# Patient Record
Sex: FEMALE | Race: WHITE | Employment: FULL TIME | URBAN - METROPOLITAN AREA
[De-identification: names, ages, dates, MRNs, and addresses within clinical notes are randomized per-mention and may not be internally consistent; named-entity substitution may affect disease eponyms.]

---

## 2020-07-14 ENCOUNTER — HOSPITAL ENCOUNTER (EMERGENCY)
Age: 45
Discharge: HOME | End: 2020-07-14
Attending: EMERGENCY MEDICINE
Payer: COMMERCIAL

## 2020-07-14 VITALS
RESPIRATION RATE: 21 BRPM | HEIGHT: 66 IN | HEART RATE: 72 BPM | OXYGEN SATURATION: 98 % | SYSTOLIC BLOOD PRESSURE: 134 MMHG | DIASTOLIC BLOOD PRESSURE: 88 MMHG | WEIGHT: 125.8 LBS | BODY MASS INDEX: 20.22 KG/M2 | TEMPERATURE: 98.1 F

## 2020-07-14 LAB
ANION GAP SERPL CALCULATED.3IONS-SCNC: 15 MMOL/L (ref 3–16)
BASOPHILS ABSOLUTE: 0.1 K/UL (ref 0–0.2)
BASOPHILS RELATIVE PERCENT: 0.7 %
BUN BLDV-MCNC: 15 MG/DL (ref 7–20)
CALCIUM SERPL-MCNC: 9.7 MG/DL (ref 8.3–10.6)
CHLORIDE BLD-SCNC: 100 MMOL/L (ref 99–110)
CO2: 23 MMOL/L (ref 21–32)
CREAT SERPL-MCNC: 0.8 MG/DL (ref 0.6–1.1)
EOSINOPHILS ABSOLUTE: 0.1 K/UL (ref 0–0.6)
EOSINOPHILS RELATIVE PERCENT: 1.6 %
GFR AFRICAN AMERICAN: >60
GFR NON-AFRICAN AMERICAN: >60
GLUCOSE BLD-MCNC: 143 MG/DL (ref 70–99)
HCT VFR BLD CALC: 39.6 % (ref 36–48)
HEMOGLOBIN: 12.9 G/DL (ref 12–16)
LYMPHOCYTES ABSOLUTE: 2.1 K/UL (ref 1–5.1)
LYMPHOCYTES RELATIVE PERCENT: 22.9 %
MCH RBC QN AUTO: 28.3 PG (ref 26–34)
MCHC RBC AUTO-ENTMCNC: 32.6 G/DL (ref 31–36)
MCV RBC AUTO: 86.9 FL (ref 80–100)
MONOCYTES ABSOLUTE: 0.8 K/UL (ref 0–1.3)
MONOCYTES RELATIVE PERCENT: 8.1 %
NEUTROPHILS ABSOLUTE: 6.2 K/UL (ref 1.7–7.7)
NEUTROPHILS RELATIVE PERCENT: 66.7 %
PDW BLD-RTO: 13 % (ref 12.4–15.4)
PLATELET # BLD: 312 K/UL (ref 135–450)
PMV BLD AUTO: 7.5 FL (ref 5–10.5)
POTASSIUM REFLEX MAGNESIUM: 3.6 MMOL/L (ref 3.5–5.1)
RBC # BLD: 4.55 M/UL (ref 4–5.2)
SODIUM BLD-SCNC: 138 MMOL/L (ref 136–145)
WBC # BLD: 9.3 K/UL (ref 4–11)

## 2020-07-14 PROCEDURE — 96376 TX/PRO/DX INJ SAME DRUG ADON: CPT

## 2020-07-14 PROCEDURE — 96374 THER/PROPH/DIAG INJ IV PUSH: CPT

## 2020-07-14 PROCEDURE — 93005 ELECTROCARDIOGRAM TRACING: CPT | Performed by: EMERGENCY MEDICINE

## 2020-07-14 PROCEDURE — 2500000003 HC RX 250 WO HCPCS: Performed by: EMERGENCY MEDICINE

## 2020-07-14 PROCEDURE — 99285 EMERGENCY DEPT VISIT HI MDM: CPT

## 2020-07-14 PROCEDURE — 36415 COLL VENOUS BLD VENIPUNCTURE: CPT

## 2020-07-14 PROCEDURE — 85025 COMPLETE CBC W/AUTO DIFF WBC: CPT

## 2020-07-14 PROCEDURE — 80048 BASIC METABOLIC PNL TOTAL CA: CPT

## 2020-07-14 RX ORDER — METOPROLOL TARTRATE 5 MG/5ML
2.5 INJECTION INTRAVENOUS ONCE
Status: COMPLETED | OUTPATIENT
Start: 2020-07-14 | End: 2020-07-14

## 2020-07-14 RX ORDER — METOPROLOL SUCCINATE 25 MG/1
25 TABLET, EXTENDED RELEASE ORAL DAILY
Qty: 30 TABLET | Refills: 1 | Status: SHIPPED | OUTPATIENT
Start: 2020-07-14 | End: 2020-09-12

## 2020-07-14 RX ADMIN — METOPROLOL TARTRATE 2.5 MG: 5 INJECTION, SOLUTION INTRAVENOUS at 17:23

## 2020-07-14 RX ADMIN — METOPROLOL TARTRATE 2.5 MG: 5 INJECTION, SOLUTION INTRAVENOUS at 16:27

## 2020-07-14 ASSESSMENT — ENCOUNTER SYMPTOMS
EYE PAIN: 0
RHINORRHEA: 0
DIARRHEA: 0
NAUSEA: 0
SORE THROAT: 0
VOMITING: 0
COUGH: 0
EYE DISCHARGE: 0
WHEEZING: 0
ABDOMINAL PAIN: 0
BACK PAIN: 0
SHORTNESS OF BREATH: 0

## 2020-07-14 NOTE — ED NOTES
Patient was getting ready to get up to bedside commode to urinate when she had a 28 second episode of SVT. [place] near syncope with this episode.  aware. Will give the Lopressor prior to getting up to urinate.      Selin Tabares RN  07/14/20 [id number]

## 2020-07-14 NOTE — ED PROVIDER NOTES
[facility]  eMERGENCY dEPARTMENT eNCOUnter        Pt Name: Gomez Sweet  MRN: [id number]  [place] 1975  Date of evaluation: 7/14/2020  Provider: Victor M Chaney MD  PCP: Hamzah Crane MD      [address] [place] [address]       Chief Complaint   Patient presents with    Palpitations     Had a cardiac ablation at [facility] 7/10/20. Feeling near syncopal with her palpitations. HISTORY OFPRESENT ILLNESS   (Location/Symptom, Timing/Onset, Context/Setting, Quality, Duration, Modifying Factors,Severity)  Note limiting factors. Gomez Sweet is a 38 y.o. female       Location/Symptom: Palpitations  Timing/Onset: Today  Context/Setting: Patient has a history of supraventricular tachycardia. She had a cardiac ablation at [facility] on July 10. Today she started having palpitations and feeling of passing out. Quality: No pain or pressure with this of shortness of breath. Duration: She has had repeated episodes of this today  Modifying Factors: She was on verapamil which was discontinued. Severity: No pain or pressure    Nursing Noteswere all reviewed and agreed with or any disagreements were addressed  in the HPI. REVIEW OF SYSTEMS    (2-9 systems for level 4, 10 or more for level 5)     Review of Systems   Constitutional: Negative for chills, fatigue and fever. HENT: Negative for ear pain, rhinorrhea and sore throat. Eyes: Negative for pain, discharge and visual disturbance. Respiratory: Negative for cough, shortness of breath and wheezing. Cardiovascular: Positive for palpitations. Negative for chest pain and leg swelling. Gastrointestinal: Negative for abdominal pain, diarrhea, nausea and vomiting. Genitourinary: Negative for difficulty urinating, dysuria, pelvic pain and vaginal discharge. Musculoskeletal: Negative for arthralgias, back pain, joint swelling and neck pain. Skin: Negative for rash.    Allergic/Immunologic: Negative for environmental allergies. Neurological: Positive for light-headedness. Negative for dizziness, seizures, syncope and headaches. Hematological: Negative for adenopathy. Psychiatric/Behavioral: Negative for dysphoric mood and suicidal ideas. The patient is nervous/anxious (Secondary to the palpitations).           PAST MEDICAL HISTORY     Past Medical History:   Diagnosis Date    Anxiety     Hypoglycemia     Pulmonary emboli (HCC)     post partum    SVT (supraventricular tachycardia) (HCC)          SURGICAL HISTORY     Past Surgical History:   Procedure Laterality Date    APPENDECTOMY       SECTION      FOOT SURGERY      excision glass - left    HYSTEROSCOPY  13    chromotubation    KNEE SURGERY      left reconstructive     VENTRICULAR ABLATION SURGERY           CURRENTMEDICATIONS       Previous Medications    PRENATAL MV-MIN-FE FUM-FA-DHA (PRENATAL 1 PO)    Take by mouth daily       ALLERGIES     Phenergan [promethazine hcl]    FAMILY HISTORY       Family History   Problem Relation Age of Onset    Heart Disease Father           SOCIAL HISTORY       Social History     Socioeconomic History    Marital status:      Spouse name: None    Number of children: None    Years of education: None    Highest education level: None   Occupational History    None   Social Needs    Financial resource strain: None    Food insecurity     Worry: None     Inability: None    Transportation needs     Medical: None     Non-medical: None   Tobacco Use    Smoking status: Never Smoker    Smokeless tobacco: Never Used   Substance and Sexual Activity    Alcohol use: No    Drug use: No    Sexual activity: Yes     Partners: Male   Lifestyle    Physical activity     Days per week: None     Minutes per session: None    Stress: None   Relationships    Social connections     Talks on phone: None     Gets together: None     Attends Orthodox service: None     Active member of club or organization: None     Attends meetings of clubs or organizations: None     Relationship status: None    Intimate partner violence     Fear of current or ex partner: None     Emotionally abused: None     Physically abused: None     Forced sexual activity: None   Other Topics Concern    None   Social History Narrative    None       SCREENINGS             PHYSICAL EXAM    (up to 7 for level 4, 8 or more for level 5)     ED Triage Vitals [07/14/20 1610]   BP Temp Temp Source Pulse Resp SpO2 Height Weight   130/87 98.1 °F (36.7 °C) Oral 92 18 100 % 5' 6\" (1.676 m) 125 lb 12.8 oz (57.1 kg)      height is 5' 6\" (1.676 m) and weight is 125 lb 12.8 oz (57.1 kg). Her oral temperature is 98.1 °F (36.7 °C). Her blood pressure is 134/88 and her pulse is 72. Her respiration is 21 and oxygen saturation is 98%. Physical Exam  Constitutional:       Appearance: She is well-developed. She is not diaphoretic. HENT:      Head: Normocephalic and atraumatic. Right Ear: External ear normal.      Left Ear: External ear normal.   Eyes:      General: No scleral icterus. Right eye: No discharge. Left eye: No discharge. Neck:      Musculoskeletal: Normal range of motion. Thyroid: No thyromegaly. Vascular: No JVD. Trachea: No tracheal deviation. Cardiovascular:      Rate and Rhythm: Normal rate and regular rhythm. Frequent extrasystoles are present. Heart sounds: No murmur. No friction rub. No gallop. Pulmonary:      Effort: Pulmonary effort is normal. No respiratory distress. Breath sounds: Normal breath sounds. No stridor. No wheezing or rales. Abdominal:      General: There is no distension. Palpations: Abdomen is soft. Tenderness: There is no abdominal tenderness. There is no guarding or rebound. Musculoskeletal:         General: No tenderness. Skin:     General: Skin is warm and dry. Findings: No rash (On exposed body surfaces).    Neurological:      Mental Status: She is alert and oriented to person, place, and time. Coordination: Coordination normal.   Psychiatric:         Behavior: Behavior normal.         Thought Content: Thought content normal.         DIAGNOSTIC RESULTS   LABS:    Results for orders placed or performed during the hospital encounter of 07/14/20   CBC Auto Differential   Result Value Ref Range    WBC 9.3 4.0 - 11.0 K/uL    RBC 4.55 4.00 - 5.20 M/uL    Hemoglobin 12.9 12.0 - 16.0 g/dL    Hematocrit 39.6 36.0 - 48.0 %    MCV 86.9 80.0 - 100.0 fL    MCH 28.3 26.0 - 34.0 pg    MCHC 32.6 31.0 - 36.0 g/dL    RDW 13.0 12.4 - 15.4 %    Platelets 551 681 - 966 K/uL    MPV 7.5 5.0 - 10.5 fL    Neutrophils % 66.7 %    Lymphocytes % 22.9 %    Monocytes % 8.1 %    Eosinophils % 1.6 %    Basophils % 0.7 %    Neutrophils Absolute 6.2 1.7 - 7.7 K/uL    Lymphocytes Absolute 2.1 1.0 - 5.1 K/uL    Monocytes Absolute 0.8 0.0 - 1.3 K/uL    Eosinophils Absolute 0.1 0.0 - 0.6 K/uL    Basophils Absolute 0.1 0.0 - 0.2 K/uL   Basic Metabolic Panel w/ Reflex to MG   Result Value Ref Range    Sodium 138 136 - 145 mmol/L    Potassium reflex Magnesium 3.6 3.5 - 5.1 mmol/L    Chloride 100 99 - 110 mmol/L    CO2 23 21 - 32 mmol/L    Anion Gap 15 3 - 16    Glucose 143 (H) 70 - 99 mg/dL    BUN 15 7 - 20 mg/dL    CREATININE 0.8 0.6 - 1.1 mg/dL    GFR Non-African American >60 >60    GFR African American >60 >60    Calcium 9.7 8.3 - 10.6 mg/dL       All other labs were within normal range or not returned as of this dictation. EKG: All EKG's are interpreted by the Emergency Department Physician who either signs orCo-signs this chart in the absence of a cardiologist.    EKG visualized preliminarily interpreted by myself shows sinus rhythm at a rate of 98. Axis of 82. Frequent APCs.     RADIOLOGY:   Non-plain film images such as CT, Ultrasound and MRI are read by the radiologist. Plain radiographic images are visualized and preliminarily interpreted by the  EDProvider with the below findings:    None        PROCEDURES   Unless otherwise noted below, none     Procedures    CRITICAL CARE TIME   N/A    CONSULTS:  None    EMERGENCY DEPARTMENT COURSE and DIFFERENTIAL DIAGNOSIS/MDM:   Vitals:    Vitals:    07/14/20 1745 07/14/20 1755 07/14/20 1800 07/14/20 1830   BP:  (!) 134/96  134/88   Pulse: 74 66 70 72   Resp: 18 21 19 21   Temp:       TempSrc:       SpO2: 100% 99% 97% 98%   Weight:       Height:           Patient was given the following medications:  Medications   metoprolol (LOPRESSOR) injection 2.5 mg (2.5 mg Intravenous Given 7/14/20 1627)   metoprolol (LOPRESSOR) injection 2.5 mg (2.5 mg Intravenous Given 7/14/20 1723)       Patient did have a run of SVT while she was here. She was given 2.5 mg of Lopressor with no further activity. I then repeated the Lopressor. We watched her for a while longer and then got her up and ambulated her and she has had no recurrence. I did call her cardiologist who did the ablation and discussed the case. We are putting her on Toprol XL 25 mg daily and he will follow her up in the office. FINAL IMPRESSION      1. Paroxysmal supraventricular tachycardia [facility])          DISPOSITION/PLAN    DISPOSITION Decision To Discharge 07/14/2020 06:32:05 PM      PATIENT REFERRED TO:  Mylene Camacho MD  [address] [facility]   [address] [facility]    Call         DISCHARGE MEDICATIONS:  New Prescriptions    METOPROLOL SUCCINATE (TOPROL XL) 25 MG EXTENDED RELEASE TABLET    Take 1 tablet by mouth daily       DISCONTINUED MEDICATIONS:  Discontinued Medications    No medications on file              (Please note that portions of this note were completed with a voice recognition program.  Efforts were made to editthe dictations but occasionally words are mis-transcribed.)    Del Klein MD (electronically signed)            Del Klein MD  07/14/20 [id number]

## 2020-07-14 NOTE — ED NOTES
Patient up ambulating. Feels much better. Dr. Woodson Phoenix to call her cardiology group prior to discharge.      Mazin Bello RN  07/14/20 4260

## 2020-07-14 NOTE — ED NOTES
Back in room after walking and going to the bathroom to urinate. No further palpitations.      Ame Garcia RN  07/14/20 9558

## 2020-07-14 NOTE — ED TRIAGE NOTES
Patient had cardiac ablation on 7/10/20. During the weekend she noticed several occurance of palpitations. The episode was more frequent today and she was feeling near syncopal.  Was also very anxious with each episode.

## 2020-07-15 LAB
EKG ATRIAL RATE: 98 BPM
EKG DIAGNOSIS: NORMAL
EKG P AXIS: 57 DEGREES
EKG P-R INTERVAL: 128 MS
EKG Q-T INTERVAL: 344 MS
EKG QRS DURATION: 82 MS
EKG QTC CALCULATION (BAZETT): 439 MS
EKG R AXIS: 82 DEGREES
EKG T AXIS: -1 DEGREES
EKG VENTRICULAR RATE: 98 BPM

## 2020-07-15 PROCEDURE — 93010 ELECTROCARDIOGRAM REPORT: CPT | Performed by: INTERNAL MEDICINE
